# Patient Record
Sex: FEMALE | Race: WHITE | URBAN - METROPOLITAN AREA
[De-identification: names, ages, dates, MRNs, and addresses within clinical notes are randomized per-mention and may not be internally consistent; named-entity substitution may affect disease eponyms.]

---

## 2023-12-04 ENCOUNTER — OFFICE (OUTPATIENT)
Dept: URBAN - METROPOLITAN AREA CLINIC 104 | Facility: CLINIC | Age: 73
Setting detail: OPHTHALMOLOGY
End: 2023-12-04
Payer: MEDICARE

## 2023-12-04 DIAGNOSIS — H16.223: ICD-10-CM

## 2023-12-04 DIAGNOSIS — H35.379: ICD-10-CM

## 2023-12-04 DIAGNOSIS — H25.13: ICD-10-CM

## 2023-12-04 DIAGNOSIS — H43.811: ICD-10-CM

## 2023-12-04 PROCEDURE — 92134 CPTRZ OPH DX IMG PST SGM RTA: CPT | Performed by: OPHTHALMOLOGY

## 2023-12-04 PROCEDURE — 92004 COMPRE OPH EXAM NEW PT 1/>: CPT | Performed by: OPHTHALMOLOGY

## 2023-12-04 ASSESSMENT — REFRACTION_AUTOREFRACTION
OS_AXIS: 164
OD_SPHERE: -2.00
OD_CYLINDER: -1.25
OS_SPHERE: --3.00
OD_AXIS: 37
OS_CYLINDER: -1.75

## 2023-12-04 ASSESSMENT — REFRACTION_CURRENTRX
OD_SPHERE: -1.50
OS_AXIS: 165
OD_CYLINDER: -1.75
OD_AXIS: 47
OD_VPRISM_DIRECTION: SV
OS_VPRISM_DIRECTION: SV
OS_CYLINDER: -1.75
OS_SPHERE: -2.00
OS_OVR_VA: 20/
OD_OVR_VA: 20/

## 2023-12-04 ASSESSMENT — REFRACTION_MANIFEST
OS_SPHERE: -2.25
OU_VA: 20/25-
OS_CYLINDER: -1.00
OS_AXIS: 170
OS_VA1: 20/30+
OD_VA1: 20/25+1
OD_CYLINDER: -1.50
OD_AXIS: 35
OD_SPHERE: -1.50

## 2023-12-04 ASSESSMENT — TEAR BREAK UP TIME (TBUT): OD_TBUT: 2+ 4 SEC

## 2023-12-04 ASSESSMENT — SPHEQUIV_DERIVED
OD_SPHEQUIV: -2.25
OD_SPHEQUIV: -2.625
OS_SPHEQUIV: -2.75

## 2023-12-04 ASSESSMENT — CONFRONTATIONAL VISUAL FIELD TEST (CVF)
OS_FINDINGS: FULL
OD_FINDINGS: FULL

## 2024-04-29 PROBLEM — Z00.00 ENCOUNTER FOR PREVENTIVE HEALTH EXAMINATION: Status: ACTIVE | Noted: 2024-04-29

## 2024-04-30 ENCOUNTER — APPOINTMENT (OUTPATIENT)
Dept: ORTHOPEDIC SURGERY | Facility: CLINIC | Age: 74
End: 2024-04-30
Payer: MEDICARE

## 2024-04-30 VITALS — WEIGHT: 160 LBS | BODY MASS INDEX: 27.31 KG/M2 | HEIGHT: 64 IN

## 2024-04-30 PROCEDURE — 20610 DRAIN/INJ JOINT/BURSA W/O US: CPT | Mod: RT

## 2024-04-30 PROCEDURE — 99204 OFFICE O/P NEW MOD 45 MIN: CPT | Mod: 25

## 2024-04-30 NOTE — DATA REVIEWED
[Outside X-rays] : outside x-rays [Right] : of the right [Knee] : knee [I reviewed the films/CD] : I reviewed the films/CD [FreeTextEntry1] : mild patellofemoral arthritis. Sonogram shows baker cyst

## 2024-04-30 NOTE — PHYSICAL EXAM
[Right] : right knee [NL (0)] : extension 0 degrees [5___] : hamstring 5[unfilled]/5 [] : negative Lachmann [Equivocal] : equivocal Sharan [TWNoteComboBox7] : flexion 115 degrees

## 2024-04-30 NOTE — HISTORY OF PRESENT ILLNESS
[Dull/Aching] : dull/aching [Localized] : localized [Sharp] : sharp [de-identified] : Has pain right knee past month, no injury. No clicking, locking or giving way. No h/o diabetes, does have primary liver disease [] : no [FreeTextEntry1] : right knee  [FreeTextEntry3] : 1 month  [FreeTextEntry5] : no injury, patient is feeling increasing pain in the knee

## 2024-05-13 ENCOUNTER — APPOINTMENT (OUTPATIENT)
Dept: ORTHOPEDIC SURGERY | Facility: CLINIC | Age: 74
End: 2024-05-13
Payer: MEDICARE

## 2024-05-13 VITALS — WEIGHT: 160 LBS | BODY MASS INDEX: 27.31 KG/M2 | HEIGHT: 64 IN

## 2024-05-13 PROCEDURE — 99213 OFFICE O/P EST LOW 20 MIN: CPT

## 2024-05-13 NOTE — HISTORY OF PRESENT ILLNESS
[Dull/Aching] : dull/aching [Localized] : localized [Sharp] : sharp [de-identified] : Has pain right knee past month, no injury. No clicking, locking or giving way. No h/o diabetes, does have primary liver disease [] : no [FreeTextEntry1] : right knee  [FreeTextEntry3] : 1 month  [FreeTextEntry5] : no injury, patient is feeling increasing pain in the knee

## 2024-05-14 RX ORDER — HYALURONATE SODIUM 20 MG/2 ML
20 SYRINGE (ML) INTRAARTICULAR
Qty: 3 | Refills: 0 | Status: ACTIVE | COMMUNITY
Start: 2024-05-14 | End: 1900-01-01

## 2024-05-20 ENCOUNTER — APPOINTMENT (OUTPATIENT)
Dept: ORTHOPEDIC SURGERY | Facility: CLINIC | Age: 74
End: 2024-05-20
Payer: MEDICARE

## 2024-05-20 VITALS — WEIGHT: 160 LBS | BODY MASS INDEX: 27.31 KG/M2 | HEIGHT: 64 IN

## 2024-05-20 PROCEDURE — 99213 OFFICE O/P EST LOW 20 MIN: CPT

## 2024-05-20 NOTE — HISTORY OF PRESENT ILLNESS
[Dull/Aching] : dull/aching [Localized] : localized [Sharp] : sharp [de-identified] : Has pain right knee past month, no injury. No clicking, locking or giving way. No h/o diabetes, does have primary liver disease [] : no [FreeTextEntry1] : right knee  [FreeTextEntry3] : 1 month  [FreeTextEntry5] : no injury, patient is feeling increasing pain in the knee

## 2024-05-20 NOTE — PHYSICAL EXAM
[Right] : right knee [NL (0)] : extension 0 degrees [5___] : hamstring 5[unfilled]/5 [Equivocal] : equivocal Sharan [] : negative Lachmann [TWNoteComboBox7] : flexion 115 degrees

## 2024-05-20 NOTE — REASON FOR VISIT
[FreeTextEntry2] :   05/20/2024: continued right knee pain 05/13/2024 Only had mild benefit from CSI

## 2024-05-24 ENCOUNTER — APPOINTMENT (OUTPATIENT)
Dept: MRI IMAGING | Facility: CLINIC | Age: 74
End: 2024-05-24

## 2024-06-06 ENCOUNTER — APPOINTMENT (OUTPATIENT)
Dept: ORTHOPEDIC SURGERY | Facility: CLINIC | Age: 74
End: 2024-06-06
Payer: MEDICARE

## 2024-06-06 DIAGNOSIS — M17.11 UNILATERAL PRIMARY OSTEOARTHRITIS, RIGHT KNEE: ICD-10-CM

## 2024-06-06 DIAGNOSIS — S83.241A OTHER TEAR OF MEDIAL MENISCUS, CURRENT INJURY, RIGHT KNEE, INITIAL ENCOUNTER: ICD-10-CM

## 2024-06-06 DIAGNOSIS — M71.21 SYNOVIAL CYST OF POPLITEAL SPACE [BAKER], RIGHT KNEE: ICD-10-CM

## 2024-06-06 PROCEDURE — 99214 OFFICE O/P EST MOD 30 MIN: CPT | Mod: 25

## 2024-06-06 PROCEDURE — 20611 DRAIN/INJ JOINT/BURSA W/US: CPT | Mod: RT

## 2024-06-06 NOTE — DATA REVIEWED
[MRI] : MRI [Right] : of the right [Knee] : knee [I reviewed the films/CD] : I reviewed the films/CD [FreeTextEntry1] : medial meniscal tear posterior horn, advanced patellofemoral arthritis, baker cyst

## 2024-06-06 NOTE — PROCEDURE
[Large Joint Injection] : Large joint injection [Right] : of the right [Knee] : knee [Pain] : pain [Alcohol] : alcohol [Betadine] : betadine [Ethyl Chloride sprayed topically] : ethyl chloride sprayed topically [Sterile technique used] : sterile technique used [Other: ___] : [unfilled] [Aspiration of Baker's cyst] : aspiration of Baker's cyst [de-identified] : clear [de-identified] : 20cc

## 2024-06-06 NOTE — ASSESSMENT
[FreeTextEntry1] : MRI reviewed, will aspirate baker cyst with ultrasonic guidance Awaiting authorization for Euflexxa Wouldn't suggest surgery for the MMT in light of the arthritis present, may not get optimal result

## 2024-06-06 NOTE — HISTORY OF PRESENT ILLNESS
[Dull/Aching] : dull/aching [Localized] : localized [Sharp] : sharp [de-identified] : Has pain right knee past month, no injury. No clicking, locking or giving way. No h/o diabetes, does have primary liver disease [] : no [FreeTextEntry1] : right knee  [FreeTextEntry3] : 1 month  [FreeTextEntry5] : no injury, patient is feeling increasing pain in the knee

## 2024-06-06 NOTE — REASON FOR VISIT
[FreeTextEntry2] : 06/06/2024 Had MRI(not yet read by radiologist)  05/20/2024: continued right knee pain 05/13/2024 Only had mild benefit from CSI